# Patient Record
Sex: MALE | Race: WHITE | NOT HISPANIC OR LATINO
[De-identification: names, ages, dates, MRNs, and addresses within clinical notes are randomized per-mention and may not be internally consistent; named-entity substitution may affect disease eponyms.]

---

## 2020-07-07 ENCOUNTER — ERX REFILL RESPONSE (OUTPATIENT)
Age: 50
End: 2020-07-07

## 2020-07-07 RX ORDER — ADALIMUMAB 40MG/0.4ML
INJECT 0.4 ML (40 MG) UNDER THE SKIN EVERY TWO WEEKS IN THE ABDOMEN ORTHIGH (ROTATE SITES) KIT SUBCUTANEOUS
Qty: 2 | Refills: 10

## 2020-07-14 ENCOUNTER — OFFICE VISIT (OUTPATIENT)
Dept: URBAN - METROPOLITAN AREA CLINIC 96 | Facility: CLINIC | Age: 50
End: 2020-07-14

## 2020-07-14 RX ORDER — RIVAROXABAN 20 MG/1
TABLET, FILM COATED ORAL
Qty: 0 | Refills: 0 | Status: ACTIVE | COMMUNITY
Start: 1900-01-01 | End: 1900-01-01

## 2020-07-14 RX ORDER — BALSALAZIDE DISODIUM 750 MG/1
TAKE 6 CAPSULES BY ORAL ROUTE DAILY FOR 90 DAYS CAPSULE ORAL 1
Qty: 540 | Refills: 4 | Status: ACTIVE | COMMUNITY
Start: 2020-02-18 | End: 2021-05-13

## 2020-07-14 RX ORDER — ADALIMUMAB 40MG/0.4ML
INJECT 0.4 ML (40 MG) UNDER THE SKIN EVERY TWO WEEKS IN THE ABDOMEN ORTHIGH (ROTATE SITES) KIT SUBCUTANEOUS
Qty: 2 | Refills: 10 | Status: ACTIVE | COMMUNITY

## 2021-07-08 ENCOUNTER — TELEPHONE ENCOUNTER (OUTPATIENT)
Dept: URBAN - METROPOLITAN AREA CLINIC 92 | Facility: CLINIC | Age: 51
End: 2021-07-08

## 2021-08-20 ENCOUNTER — TELEPHONE ENCOUNTER (OUTPATIENT)
Dept: URBAN - METROPOLITAN AREA CLINIC 92 | Facility: CLINIC | Age: 51
End: 2021-08-20

## 2021-08-20 RX ORDER — ADALIMUMAB 40MG/0.4ML
1 PEN KIT SUBCUTANEOUS
Qty: 1 | Refills: 11

## 2022-09-15 ENCOUNTER — TELEPHONE ENCOUNTER (OUTPATIENT)
Dept: URBAN - METROPOLITAN AREA CLINIC 96 | Facility: CLINIC | Age: 52
End: 2022-09-15

## 2022-09-15 RX ORDER — ADALIMUMAB 40MG/0.4ML
1 PEN KIT SUBCUTANEOUS
Qty: 1 | Refills: 11
End: 2022-09-28

## 2022-09-21 ENCOUNTER — TELEPHONE ENCOUNTER (OUTPATIENT)
Dept: URBAN - METROPOLITAN AREA CLINIC 92 | Facility: CLINIC | Age: 52
End: 2022-09-21

## 2022-09-21 RX ORDER — ADALIMUMAB 40MG/0.4ML
1 PEN KIT SUBCUTANEOUS
Qty: 1 | Refills: 11
End: 2022-10-03

## 2022-10-17 ENCOUNTER — TELEPHONE ENCOUNTER (OUTPATIENT)
Dept: URBAN - METROPOLITAN AREA CLINIC 92 | Facility: CLINIC | Age: 52
End: 2022-10-17

## 2022-10-17 ENCOUNTER — LAB OUTSIDE AN ENCOUNTER (OUTPATIENT)
Dept: URBAN - METROPOLITAN AREA TELEHEALTH 2 | Facility: TELEHEALTH | Age: 52
End: 2022-10-17

## 2022-10-17 ENCOUNTER — OFFICE VISIT (OUTPATIENT)
Dept: URBAN - METROPOLITAN AREA TELEHEALTH 2 | Facility: TELEHEALTH | Age: 52
End: 2022-10-17
Payer: COMMERCIAL

## 2022-10-17 ENCOUNTER — DASHBOARD ENCOUNTERS (OUTPATIENT)
Age: 52
End: 2022-10-17

## 2022-10-17 DIAGNOSIS — Z79.899 LONG-TERM USE OF IMMUNOSUPPRESSANT MEDICATION: ICD-10-CM

## 2022-10-17 DIAGNOSIS — K51.80 CHRONIC PANCOLONIC ULCERATIVE COLITIS: ICD-10-CM

## 2022-10-17 DIAGNOSIS — Z91.89 COLON CANCER HIGH RISK: ICD-10-CM

## 2022-10-17 PROCEDURE — 99214 OFFICE O/P EST MOD 30 MIN: CPT | Performed by: INTERNAL MEDICINE

## 2022-10-17 PROCEDURE — 99215 OFFICE O/P EST HI 40 MIN: CPT | Performed by: INTERNAL MEDICINE

## 2022-10-17 RX ORDER — RIVAROXABAN 20 MG/1
TABLET, FILM COATED ORAL
Qty: 0 | Refills: 0 | Status: ACTIVE | COMMUNITY
Start: 1900-01-01 | End: 1900-01-01

## 2022-10-17 RX ORDER — ADALIMUMAB 40MG/0.4ML
1 PEN KIT SUBCUTANEOUS
Qty: 1 | Refills: 11 | OUTPATIENT
Start: 2022-10-17 | End: 2022-10-29

## 2022-10-17 RX ORDER — PREDNISONE 10 MG/1
1 TABLET TABLET ORAL ONCE A DAY
Qty: 30 TABLET | Refills: 0 | OUTPATIENT
Start: 2022-10-17 | End: 2022-11-15

## 2022-10-17 RX ORDER — POLYETHYLENE GLYCOL 3350, SODIUM SULFATE, SODIUM CHLORIDE, POTASSIUM CHLORIDE, ASCORBIC ACID, SODIUM ASCORBATE 140-9-5.2G
1 KIT KIT ORAL ONCE
Qty: 1 | Refills: 1 | OUTPATIENT
Start: 2022-11-11 | End: 2022-11-13

## 2022-10-17 NOTE — HPI-TODAY'S VISIT:
Chris Anderson is a 51 y/o individual with h/o Colitis with fistula, currently on Humira (started 8/2019) and balsaslazide (6 tab, stopped) here for evaluation of refractory disease. . Pt is referred by Dr. Granado. . Pt was diagnosed with UC in 2014/15.  He was started on steroids.  He took Lialda.  He was on Remicade for a year or so, but stopped due to insurance change and also lack of response.  He has h/o small bowl resection for bowel obstruction.  Since then, he has not been on any medications.  He had previously (2017/18) had seton in place for fistula/abscess.  This is no longer active. . Previously on 7/19, he is on prednisone.  He is having normal BM, some blood in the stool is present.  . Previously on 9/17/2019, he reports that he got hospitalized for flare and severe anemia.  Was given pRBC.  He had already been approved for Humira, but did not start it.  Since hospital stay, he has taken 2 doses of 160mg (first in the hospital).  He has normal BM, no blood in the stool.  He has tapered off of steroids. . Previously on 10/22/2019, he reports that he had the iron infusions (2 doses).  Having 3 BM per day (semi-solid/loose), no blood in the stool.  Did not start the Colazol (did not want to take a lot of pills).  He has noticed increase loss since starting Humira.  Also some HA post injection of Humira. . Previously on 2/18/2020, pt reports that he continues to have 3 BM per day.  He started balsalazide after last visit, did not make huge difference in his sxs.  Took Humira today.  His DM wants him to start on  . Today on 10/17/2022, pt reports that he is doing well.  No diarrhea, no rectal bleeding.    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  Biopsies were taken with a cold forceps for histology from the entire colon. - Multiple pseudopolyps were found in the entire colon. FinalPathologicDiagnosis A.ASCENDINGCOLON,BIOPSY: -ACTIVECOLITIS.     B.TRANSVERSECOLON,BIOPSY: -ACTIVECOLITIS.     C.DESCENDINGCOLON,BIOPSY: -ACTIVECOLITIS.     D.SIGMOIDCOLON,BIOPSY: -ACTIVECOLITIS.

## 2022-12-20 PROBLEM — 442159003 CHRONIC ULCERATIVE PANCOLITIS: Status: ACTIVE | Noted: 2022-09-16

## 2023-02-02 ENCOUNTER — OFFICE VISIT (OUTPATIENT)
Dept: URBAN - METROPOLITAN AREA SURGERY CENTER 18 | Facility: SURGERY CENTER | Age: 53
End: 2023-02-02
Payer: COMMERCIAL

## 2023-02-02 DIAGNOSIS — K51.00 ACUTE ULCERATIVE PANCOLITIS: ICD-10-CM

## 2023-02-02 PROCEDURE — 45380 COLONOSCOPY AND BIOPSY: CPT | Performed by: INTERNAL MEDICINE

## 2023-02-02 PROCEDURE — G8907 PT DOC NO EVENTS ON DISCHARG: HCPCS | Performed by: INTERNAL MEDICINE

## 2023-02-02 RX ORDER — RIVAROXABAN 20 MG/1
TABLET, FILM COATED ORAL
Qty: 0 | Refills: 0 | Status: ACTIVE | COMMUNITY
Start: 1900-01-01 | End: 1900-01-01